# Patient Record
Sex: FEMALE | Race: WHITE | NOT HISPANIC OR LATINO | URBAN - NONMETROPOLITAN AREA
[De-identification: names, ages, dates, MRNs, and addresses within clinical notes are randomized per-mention and may not be internally consistent; named-entity substitution may affect disease eponyms.]

---

## 2024-08-16 ENCOUNTER — APPOINTMENT (OUTPATIENT)
Age: 24
End: 2024-08-16
Payer: COMMERCIAL

## 2024-08-16 VITALS
HEIGHT: 64 IN | OXYGEN SATURATION: 100 % | DIASTOLIC BLOOD PRESSURE: 78 MMHG | HEART RATE: 91 BPM | BODY MASS INDEX: 27.31 KG/M2 | SYSTOLIC BLOOD PRESSURE: 112 MMHG | WEIGHT: 160 LBS

## 2024-08-16 DIAGNOSIS — M54.6 CHRONIC RIGHT-SIDED THORACIC BACK PAIN: ICD-10-CM

## 2024-08-16 DIAGNOSIS — K21.9 GASTROESOPHAGEAL REFLUX DISEASE WITHOUT ESOPHAGITIS: Primary | ICD-10-CM

## 2024-08-16 DIAGNOSIS — G89.29 CHRONIC RIGHT-SIDED THORACIC BACK PAIN: ICD-10-CM

## 2024-08-16 DIAGNOSIS — F40.10 SOCIAL ANXIETY DISORDER: ICD-10-CM

## 2024-08-16 DIAGNOSIS — K58.8 OTHER IRRITABLE BOWEL SYNDROME: ICD-10-CM

## 2024-08-16 DIAGNOSIS — N83.201 CYST OF RIGHT OVARY: ICD-10-CM

## 2024-08-16 DIAGNOSIS — R53.83 OTHER FATIGUE: ICD-10-CM

## 2024-08-16 RX ORDER — FAMOTIDINE 10 MG/1
10 TABLET ORAL NIGHTLY PRN
COMMUNITY

## 2024-08-16 RX ORDER — FAMOTIDINE 10 MG/1
10 TABLET ORAL NIGHTLY PRN
COMMUNITY
End: 2024-08-16 | Stop reason: WASHOUT

## 2024-08-16 ASSESSMENT — PATIENT HEALTH QUESTIONNAIRE - PHQ9
1. LITTLE INTEREST OR PLEASURE IN DOING THINGS: NOT AT ALL
2. FEELING DOWN, DEPRESSED OR HOPELESS: NOT AT ALL
SUM OF ALL RESPONSES TO PHQ9 QUESTIONS 1 AND 2: 0

## 2024-08-16 ASSESSMENT — ENCOUNTER SYMPTOMS
BLOOD IN STOOL: 0
PALPITATIONS: 0
FATIGUE: 1
NAUSEA: 1
ARTHRALGIAS: 0
COUGH: 0
ABDOMINAL PAIN: 0
CHEST TIGHTNESS: 0
BACK PAIN: 0
SHORTNESS OF BREATH: 0
DIARRHEA: 0
WHEEZING: 0
VOMITING: 0

## 2024-08-16 NOTE — PROGRESS NOTES
Subjective   Patient ID: Renu Arrieta is a 24 y.o. female who presents for Establish Care (Chronic shoulder pain on the right side. Would like a tetanus if possible. ).  HPI  She is here today to get established.  She explains that she has not been seen by primary care for several years.  She does have a specific concern about her back.  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  She states she did a lot of physically hard labor with pulling and tugging and she thought that her work environment was causing her symptoms.  She states she did go through [employer] but it became quite complicated and she decided to drop but because she was not getting anywhere.  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  She is right-handed dominant and her pain revolves around the scapular region of her thoracic spine.  I did have her move her arm through full range of motion and she is bothered when she moves her arm backwards.  We decided to start with x-rays and we discussed trying physical therapy.  She states she is open to anything that might help her get better.  We did review her past medical history and also conducted a review of systems.  She does have acid reflux which she has had for the past 4 or 5 years.  She considers it minor and infrequent and she ends up taking over-the-counter Zantac.  She also states she suffers from IBS and is triggered by certain foods.  I have recommended she try the probiotic align and when she can back we will discuss whether she has had a response.  She does complain of some chronic fatigue and we discussed doing laboratory testing.  She also states she started smoking at an early age and now she vapes.  She understands that the vaping is not an ideal activity we discussed trying to set a goal for quitting at some point in the  near future.  She also states she suffers from some social anxiety although she functions well.  She states she usually takes somebody with her if she goes to a social outing.  We also discussed family history and she states she has not really close to her parents at this time.  She has also been diagnosed with a cyst of the right ovary and we estimate has been a couple years since she has had a gynecologic checkup.  I have recommended that she try to schedule an appointment at some point in the future.  Review of Systems   Constitutional:  Positive for fatigue.   Respiratory:  Negative for cough, chest tightness, shortness of breath and wheezing.    Cardiovascular:  Negative for chest pain, palpitations and leg swelling.   Gastrointestinal:  Positive for nausea. Negative for abdominal pain, blood in stool, diarrhea and vomiting.   Musculoskeletal:  Negative for arthralgias and back pain.     Objective   Physical Exam  Vitals and nursing note reviewed.   Constitutional:       General: She is not in acute distress.     Appearance: Normal appearance.   HENT:      Head: Normocephalic and atraumatic.   Eyes:      Conjunctiva/sclera: Conjunctivae normal.   Cardiovascular:      Rate and Rhythm: Normal rate and regular rhythm.      Heart sounds: Normal heart sounds.   Pulmonary:      Effort: No respiratory distress.      Breath sounds: No wheezing.   Abdominal:      Palpations: Abdomen is soft.      Tenderness: There is no abdominal tenderness. There is no guarding.   Musculoskeletal:         General: No swelling. Normal range of motion.   Skin:     General: Skin is warm and dry.   Neurological:      General: No focal deficit present.      Mental Status: She is alert and oriented to person, place, and time.   Psychiatric:         Behavior: Behavior normal.       Assessment/Plan   Problem List Items Addressed This Visit             ICD-10-CM    Gastroesophageal reflux disease without esophagitis - Primary K21.9     -She has  had mild intermittent symptoms for several years and she states it is well-controlled by taking Zantac periodically         Social anxiety disorder F40.10     -She is doing relatively well at this time         Other fatigue R53.83     -We are ordering comprehensive lab work to assess for symptoms of fatigue         Cyst of right ovary N83.201     -We discussed getting into a gynecologist for checkup         Chronic right-sided thoracic back pain M54.6, G89.29     -We are ordering x-rays and she will come back to go over the results         Relevant Orders    XR thoracic spine 2 views    Other irritable bowel syndrome K58.8     -I have suggested that she try the probiotic online for 1 month               Nevaeh Turner, DO

## 2024-08-16 NOTE — ASSESSMENT & PLAN NOTE
-She has had mild intermittent symptoms for several years and she states it is well-controlled by taking Zantac periodically